# Patient Record
Sex: MALE | Race: BLACK OR AFRICAN AMERICAN | NOT HISPANIC OR LATINO | URBAN - METROPOLITAN AREA
[De-identification: names, ages, dates, MRNs, and addresses within clinical notes are randomized per-mention and may not be internally consistent; named-entity substitution may affect disease eponyms.]

---

## 2019-01-19 VITALS
SYSTOLIC BLOOD PRESSURE: 150 MMHG | DIASTOLIC BLOOD PRESSURE: 70 MMHG | RESPIRATION RATE: 18 BRPM | HEART RATE: 76 BPM | TEMPERATURE: 98 F | OXYGEN SATURATION: 98 %

## 2019-01-19 LAB
ALBUMIN SERPL ELPH-MCNC: 3.9 G/DL — SIGNIFICANT CHANGE UP (ref 3.3–5)
ALP SERPL-CCNC: 81 U/L — SIGNIFICANT CHANGE UP (ref 40–120)
ALT FLD-CCNC: 19 U/L — SIGNIFICANT CHANGE UP (ref 10–45)
ANION GAP SERPL CALC-SCNC: 13 MMOL/L — SIGNIFICANT CHANGE UP (ref 5–17)
AST SERPL-CCNC: 17 U/L — SIGNIFICANT CHANGE UP (ref 10–40)
BILIRUB SERPL-MCNC: 1 MG/DL — SIGNIFICANT CHANGE UP (ref 0.2–1.2)
BUN SERPL-MCNC: 19 MG/DL — SIGNIFICANT CHANGE UP (ref 7–23)
CALCIUM SERPL-MCNC: 9.7 MG/DL — SIGNIFICANT CHANGE UP (ref 8.4–10.5)
CHLORIDE SERPL-SCNC: 99 MMOL/L — SIGNIFICANT CHANGE UP (ref 96–108)
CO2 SERPL-SCNC: 27 MMOL/L — SIGNIFICANT CHANGE UP (ref 22–31)
CREAT SERPL-MCNC: 1.04 MG/DL — SIGNIFICANT CHANGE UP (ref 0.5–1.3)
CRP SERPL-MCNC: 1.42 MG/DL — HIGH (ref 0–0.4)
EOSINOPHIL NFR BLD AUTO: 3.9 % — SIGNIFICANT CHANGE UP (ref 0–6)
ERYTHROCYTE [SEDIMENTATION RATE] IN BLOOD: 59 MM/HR — HIGH
GLUCOSE SERPL-MCNC: 185 MG/DL — HIGH (ref 70–99)
HCT VFR BLD CALC: 31.2 % — LOW (ref 39–50)
HGB BLD-MCNC: 9.4 G/DL — LOW (ref 13–17)
LYMPHOCYTES # BLD AUTO: 19.6 % — SIGNIFICANT CHANGE UP (ref 13–44)
MCHC RBC-ENTMCNC: 25 PG — LOW (ref 27–34)
MCHC RBC-ENTMCNC: 30.1 G/DL — LOW (ref 32–36)
MCV RBC AUTO: 83 FL — SIGNIFICANT CHANGE UP (ref 80–100)
MONOCYTES NFR BLD AUTO: 10.4 % — SIGNIFICANT CHANGE UP (ref 2–14)
NEUTROPHILS NFR BLD AUTO: 66.1 % — SIGNIFICANT CHANGE UP (ref 43–77)
PLATELET # BLD AUTO: 243 K/UL — SIGNIFICANT CHANGE UP (ref 150–400)
POTASSIUM SERPL-MCNC: 4.3 MMOL/L — SIGNIFICANT CHANGE UP (ref 3.5–5.3)
POTASSIUM SERPL-SCNC: 4.3 MMOL/L — SIGNIFICANT CHANGE UP (ref 3.5–5.3)
PROT SERPL-MCNC: 7.4 G/DL — SIGNIFICANT CHANGE UP (ref 6–8.3)
RBC # BLD: 3.76 M/UL — LOW (ref 4.2–5.8)
RBC # FLD: 13.7 % — SIGNIFICANT CHANGE UP (ref 10.3–16.9)
SODIUM SERPL-SCNC: 139 MMOL/L — SIGNIFICANT CHANGE UP (ref 135–145)
WBC # BLD: 6.8 K/UL — SIGNIFICANT CHANGE UP (ref 3.8–10.5)
WBC # FLD AUTO: 6.8 K/UL — SIGNIFICANT CHANGE UP (ref 3.8–10.5)

## 2019-01-19 RX ORDER — FERROUS SULFATE 325(65) MG
1 TABLET ORAL
Qty: 0 | Refills: 0 | COMMUNITY

## 2019-01-19 RX ORDER — BACLOFEN 100 %
1 POWDER (GRAM) MISCELLANEOUS
Qty: 0 | Refills: 0 | COMMUNITY

## 2019-01-19 RX ORDER — ATENOLOL 25 MG/1
1 TABLET ORAL
Qty: 0 | Refills: 0 | COMMUNITY

## 2019-01-19 RX ORDER — ASPIRIN/CALCIUM CARB/MAGNESIUM 324 MG
1 TABLET ORAL
Qty: 0 | Refills: 0 | COMMUNITY

## 2019-01-19 RX ORDER — AMLODIPINE BESYLATE 2.5 MG/1
1 TABLET ORAL
Qty: 0 | Refills: 0 | COMMUNITY

## 2019-01-19 RX ORDER — ALLOPURINOL 300 MG
1 TABLET ORAL
Qty: 0 | Refills: 0 | COMMUNITY

## 2019-01-19 NOTE — ED ADULT NURSE NOTE - PMH
Degenerative arthritis of knee  Right  Gout    Hypertension    Refusal of blood transfusions as patient is Baptism

## 2019-01-19 NOTE — ED PROVIDER NOTE - PMH
Degenerative arthritis of knee  Right  Gout    Hypertension    Refusal of blood transfusions as patient is Mu-ism

## 2019-01-19 NOTE — ED PROVIDER NOTE - MEDICAL DECISION MAKING DETAILS
83 s/p right knee replacement 1 week ago, in the Er due to right LE edema, worsening of pain. clinica; findings concerned about possible DVT vs post op nfection. plan : labs, doppler, re-evaluate, ortho consult. 83 s/p right knee replacement 1 week ago, in the Er due to right LE edema, worsening of pain. clinica; findings concerned about possible DVT vs post op infection. plan : labs, doppler, re-evaluate, ortho consult.

## 2019-01-19 NOTE — ED PROVIDER NOTE - OBJECTIVE STATEMENT
84 yo male with h/o HTN, gout, GERD, 1 week s/p right knee replacement in the ER today due to right LE edema, redness and worsening of pain. Pt reports he was doing well with his PT, but leg gradually became more and more painful. Pt denies SOB, CP, fever, chills, numbness or tingling to his LE.

## 2019-01-19 NOTE — ED PROVIDER NOTE - ATTENDING CONTRIBUTION TO CARE
pt seen by ortho, no DVT apparent, ortho doubted septic knee at this time however recommended antibiotics for possible mild cellulitis ,  emergent return instructions were discussed with patient

## 2019-01-19 NOTE — ED PROVIDER NOTE - MUSCULOSKELETAL, MLM
Spine appears normal, range of motion is not limited, RLE edema, erythema increased warmth, no active drainage  from the incision over the knee, right calf and ankle edema and tenderness+,

## 2019-01-19 NOTE — ED PROVIDER NOTE - PROGRESS NOTE DETAILS
no evidence of DVT above the knee on doppler; pt evaluated by ortho on call - no evidence of septic joint. pt evaluated by vascular service- admission for treatment of cellulitis recommended.

## 2019-01-19 NOTE — ED ADULT NURSE NOTE - OBJECTIVE STATEMENT
Presents to ED for swelling of RLE s/p total knee replacement on 1/11/19.  Patient reports increased swelling of the RLE has gradually gotten worse of the past 2 days.  Reports pain upon palpation of the extremity.  Bandage remains in place from surgery, which is C/D/I.  Denies fevers, CP, palpitations, SOB, numbness, tingling.

## 2019-01-20 ENCOUNTER — INPATIENT (INPATIENT)
Facility: HOSPITAL | Age: 84
LOS: 0 days | Discharge: HOME | DRG: 603 | End: 2019-01-20
Attending: INTERNAL MEDICINE | Admitting: INTERNAL MEDICINE
Payer: MEDICARE

## 2019-01-20 ENCOUNTER — TRANSCRIPTION ENCOUNTER (OUTPATIENT)
Age: 84
End: 2019-01-20

## 2019-01-20 VITALS
OXYGEN SATURATION: 97 % | DIASTOLIC BLOOD PRESSURE: 80 MMHG | HEART RATE: 67 BPM | RESPIRATION RATE: 18 BRPM | SYSTOLIC BLOOD PRESSURE: 162 MMHG | TEMPERATURE: 98 F

## 2019-01-20 DIAGNOSIS — Z96.651 PRESENCE OF RIGHT ARTIFICIAL KNEE JOINT: Chronic | ICD-10-CM

## 2019-01-20 DIAGNOSIS — I10 ESSENTIAL (PRIMARY) HYPERTENSION: ICD-10-CM

## 2019-01-20 DIAGNOSIS — Z29.9 ENCOUNTER FOR PROPHYLACTIC MEASURES, UNSPECIFIED: ICD-10-CM

## 2019-01-20 DIAGNOSIS — M10.9 GOUT, UNSPECIFIED: ICD-10-CM

## 2019-01-20 DIAGNOSIS — R60.0 LOCALIZED EDEMA: ICD-10-CM

## 2019-01-20 DIAGNOSIS — Z94.7 CORNEAL TRANSPLANT STATUS: Chronic | ICD-10-CM

## 2019-01-20 PROCEDURE — 73562 X-RAY EXAM OF KNEE 3: CPT

## 2019-01-20 PROCEDURE — 80053 COMPREHEN METABOLIC PANEL: CPT

## 2019-01-20 PROCEDURE — 85652 RBC SED RATE AUTOMATED: CPT

## 2019-01-20 PROCEDURE — 73590 X-RAY EXAM OF LOWER LEG: CPT | Mod: 26,RT

## 2019-01-20 PROCEDURE — 73562 X-RAY EXAM OF KNEE 3: CPT | Mod: 26,RT

## 2019-01-20 PROCEDURE — 93971 EXTREMITY STUDY: CPT | Mod: 26,RT

## 2019-01-20 PROCEDURE — 85025 COMPLETE CBC W/AUTO DIFF WBC: CPT

## 2019-01-20 PROCEDURE — 99285 EMERGENCY DEPT VISIT HI MDM: CPT

## 2019-01-20 PROCEDURE — 93971 EXTREMITY STUDY: CPT

## 2019-01-20 PROCEDURE — 99285 EMERGENCY DEPT VISIT HI MDM: CPT | Mod: 25

## 2019-01-20 PROCEDURE — 73590 X-RAY EXAM OF LOWER LEG: CPT

## 2019-01-20 PROCEDURE — 36415 COLL VENOUS BLD VENIPUNCTURE: CPT

## 2019-01-20 PROCEDURE — 73610 X-RAY EXAM OF ANKLE: CPT

## 2019-01-20 PROCEDURE — 86140 C-REACTIVE PROTEIN: CPT

## 2019-01-20 PROCEDURE — 99223 1ST HOSP IP/OBS HIGH 75: CPT | Mod: GC

## 2019-01-20 PROCEDURE — 73610 X-RAY EXAM OF ANKLE: CPT | Mod: 26,RT

## 2019-01-20 RX ORDER — LOSARTAN POTASSIUM 100 MG/1
50 TABLET, FILM COATED ORAL DAILY
Qty: 0 | Refills: 0 | Status: DISCONTINUED | OUTPATIENT
Start: 2019-01-20 | End: 2019-01-20

## 2019-01-20 RX ORDER — CEPHALEXIN 500 MG
1 CAPSULE ORAL
Qty: 20 | Refills: 0 | OUTPATIENT
Start: 2019-01-20 | End: 2019-01-24

## 2019-01-20 RX ORDER — ASCORBIC ACID 60 MG
500 TABLET,CHEWABLE ORAL DAILY
Qty: 0 | Refills: 0 | Status: DISCONTINUED | OUTPATIENT
Start: 2019-01-20 | End: 2019-01-20

## 2019-01-20 RX ORDER — FERROUS SULFATE 325(65) MG
325 TABLET ORAL DAILY
Qty: 0 | Refills: 0 | Status: DISCONTINUED | OUTPATIENT
Start: 2019-01-20 | End: 2019-01-20

## 2019-01-20 RX ORDER — HEPARIN SODIUM 5000 [USP'U]/ML
5000 INJECTION INTRAVENOUS; SUBCUTANEOUS EVERY 8 HOURS
Qty: 0 | Refills: 0 | Status: DISCONTINUED | OUTPATIENT
Start: 2019-01-20 | End: 2019-01-20

## 2019-01-20 RX ORDER — SENNA PLUS 8.6 MG/1
2 TABLET ORAL AT BEDTIME
Qty: 0 | Refills: 0 | Status: DISCONTINUED | OUTPATIENT
Start: 2019-01-20 | End: 2019-01-20

## 2019-01-20 RX ORDER — DOCUSATE SODIUM 100 MG
100 CAPSULE ORAL THREE TIMES A DAY
Qty: 0 | Refills: 0 | Status: DISCONTINUED | OUTPATIENT
Start: 2019-01-20 | End: 2019-01-20

## 2019-01-20 RX ORDER — VANCOMYCIN HCL 1 G
1250 VIAL (EA) INTRAVENOUS ONCE
Qty: 0 | Refills: 0 | Status: COMPLETED | OUTPATIENT
Start: 2019-01-20 | End: 2019-01-20

## 2019-01-20 RX ORDER — AMLODIPINE BESYLATE 2.5 MG/1
10 TABLET ORAL DAILY
Qty: 0 | Refills: 0 | Status: DISCONTINUED | OUTPATIENT
Start: 2019-01-20 | End: 2019-01-20

## 2019-01-20 RX ORDER — ALLOPURINOL 300 MG
100 TABLET ORAL DAILY
Qty: 0 | Refills: 0 | Status: DISCONTINUED | OUTPATIENT
Start: 2019-01-20 | End: 2019-01-20

## 2019-01-20 RX ORDER — ACETAMINOPHEN 500 MG
650 TABLET ORAL ONCE
Qty: 0 | Refills: 0 | Status: COMPLETED | OUTPATIENT
Start: 2019-01-20 | End: 2019-01-20

## 2019-01-20 RX ORDER — VANCOMYCIN HCL 1 G
1000 VIAL (EA) INTRAVENOUS ONCE
Qty: 0 | Refills: 0 | Status: DISCONTINUED | OUTPATIENT
Start: 2019-01-20 | End: 2019-01-20

## 2019-01-20 RX ORDER — ATENOLOL 25 MG/1
50 TABLET ORAL DAILY
Qty: 0 | Refills: 0 | Status: DISCONTINUED | OUTPATIENT
Start: 2019-01-20 | End: 2019-01-20

## 2019-01-20 RX ORDER — IRBESARTAN 75 MG/1
1 TABLET ORAL
Qty: 0 | Refills: 0 | COMMUNITY

## 2019-01-20 RX ADMIN — AMLODIPINE BESYLATE 10 MILLIGRAM(S): 2.5 TABLET ORAL at 09:08

## 2019-01-20 RX ADMIN — LOSARTAN POTASSIUM 50 MILLIGRAM(S): 100 TABLET, FILM COATED ORAL at 09:06

## 2019-01-20 RX ADMIN — HEPARIN SODIUM 5000 UNIT(S): 5000 INJECTION INTRAVENOUS; SUBCUTANEOUS at 13:01

## 2019-01-20 RX ADMIN — Medication 166.67 MILLIGRAM(S): at 07:42

## 2019-01-20 RX ADMIN — Medication 100 MILLIGRAM(S): at 13:01

## 2019-01-20 RX ADMIN — ATENOLOL 50 MILLIGRAM(S): 25 TABLET ORAL at 09:31

## 2019-01-20 RX ADMIN — Medication 500 MILLIGRAM(S): at 13:01

## 2019-01-20 RX ADMIN — Medication 650 MILLIGRAM(S): at 03:30

## 2019-01-20 RX ADMIN — Medication 325 MILLIGRAM(S): at 13:01

## 2019-01-20 RX ADMIN — Medication 650 MILLIGRAM(S): at 02:43

## 2019-01-20 NOTE — DISCHARGE NOTE ADULT - HOSPITAL COURSE
83yMale POD8 s/p R TKA at [facility] by Dr. Menchaca. Patient presented to ER with RLE pain and swelling. Found to have likely mild cellulitis and will be discharged with Keflex for 5 days and to follow up with PCP within 1-2 weeks. Evaluated by orthopedics and vascular and low concern for infected knee replacement. No evidence of sepsis, no fever, no chills. Patient able to ambulate on own with wife and will continue PT at home . NO CLOT seen on ultrasound negative DVT. Will be discharged with PCP and surgery follow up and 5 days of oral Keflex. He acknowledged plan for discharge and agreed to follow.

## 2019-01-20 NOTE — DISCHARGE NOTE ADULT - PROVIDER TOKENS
FREE:[LAST:[[place]],FIRST:[Primo],PHONE:[(   )    -],FAX:[(   )    -]],FREE:[LAST:[Primo],FIRST:[Brigitte],PHONE:[(   )    -],FAX:[(   )    -],ADDRESS:[[address] [place] [id number]    [phone]]]

## 2019-01-20 NOTE — H&P ADULT - PMH
Degenerative arthritis of knee  Right  Gout    Hypertension    Refusal of blood transfusions as patient is Quaker Degenerative arthritis of knee  Right  Gout    Hypertension    Refusal of blood transfusions as patient is Amish    Right retinal detachment  has some vision.

## 2019-01-20 NOTE — DISCHARGE NOTE ADULT - MEDICATION SUMMARY - MEDICATIONS TO STOP TAKING
I will STOP taking the medications listed below when I get home from the hospital:    oxycodone-acetaminophen 5 mg-325 mg oral tablet  -- 1 tab(s) by mouth every 4 hours, As Needed

## 2019-01-20 NOTE — CONSULT NOTE ADULT - SUBJECTIVE AND OBJECTIVE BOX
Vascular Attending:  Abi  HPI:  Pt is a 83yMale pmhx of HTN s/p R TKA at [facility] 1/11.  Pt states after working out with physical therapist 2days prior he  noticed significant pain and swelling in R calf. Pt denies CP, SOB fevers or chills. Pt denies trauma to the area since surgery. Denies drainage from the wound.  Pt able to bend/extend knee without complication.    PAST MEDICAL & SURGICAL HISTORY:  Refusal of blood transfusions as patient is Religion  Gout  Hypertension  Degenerative arthritis of knee: Right  H/O total knee replacement, right      REVIEW OF SYSTEMS  Neg Except for HPI	  	  Allergic/Immunologic:	    MEDICATIONS  (STANDING):  vancomycin  IVPB 1250 milliGRAM(s) IV Intermittent once    MEDICATIONS  (PRN):    Allergies  No Known Allergies    Vital Signs Last 24 Hrs  T(C): 36.7 (20 Jan 2019 06:43), Max: 37.1 (20 Jan 2019 04:16)  T(F): 98.1 (20 Jan 2019 06:43), Max: 98.8 (20 Jan 2019 04:16)  HR: 69 (20 Jan 2019 06:43) (68 - 84)  BP: 170/85 (20 Jan 2019 06:43) (150/70 - 170/85)  BP(mean): --  RR: 18 (20 Jan 2019 06:43) (16 - 18)  SpO2: 96% (20 Jan 2019 06:43) (96% - 99%)    PHYSICAL EXAM:  Constitutional: Pt AXOX3 in NAD  Respiratory: Unlabored  Cardiovascular: S1S2  Extremities: RLE Moderate Edema erythema to medial aspect with warmth, good ROM based on 1wk post op, Incision c/d/i. no drainge no vince-incisonal induration or fluctuence  Vascular: palp 2+ DP Bilaterally  Neurological: Intact    LABS:                        9.4    6.8   )-----------( 243      ( 19 Jan 2019 22:13 )             31.2     01-19    139  |  99  |  19  ----------------------------<  185<H>  4.3   |  27  |  1.04    Ca    9.7      19 Jan 2019 22:13    TPro  7.4  /  Alb  3.9  /  TBili  1.0  /  DBili  x   /  AST  17  /  ALT  19  /  AlkPhos  81  01-19          RADIOLOGY & ADDITIONAL STUDIES

## 2019-01-20 NOTE — DISCHARGE NOTE ADULT - PATIENT PORTAL LINK FT
You can access the [facility] Patient Portal, offered by [facility], by registering with the following website: [email or web address]

## 2019-01-20 NOTE — DISCHARGE NOTE ADULT - PLAN OF CARE
Resolution You were admitted with concern for cellulitis; you were evaluated by orthopedics who suspected no relation to knee replacement as site appeared well. There is some mild redness of your shin which may be cellulitis. You will be discharged on antibiotic Keflex for 5 additional days. Please follow up with your primary care doctor within the next 2 weeks. You also had ultrasound to evaluate for Clot of lower extremity which was negative. continue with home PT, and ace wrapping of left extremity.

## 2019-01-20 NOTE — H&P ADULT - PSH
H/O total knee replacement, right Corneal transplant status  right eye  H/O total knee replacement, right

## 2019-01-20 NOTE — DISCHARGE NOTE ADULT - MEDICATION SUMMARY - MEDICATIONS TO TAKE
I will START or STAY ON the medications listed below when I get home from the hospital:    aspirin 325 mg oral tablet  -- 1 tab(s) by mouth 2 times a day  -- Indication: For DVT PPX per surgeon    irbesartan 300 mg oral tablet  -- 1 tab(s) by mouth once a day  -- Indication: For HTN    allopurinol 100 mg oral tablet  -- 1 tab(s) by mouth once a day  -- Indication: For Gout prophylaxis    atenolol 50 mg oral tablet  -- 1 tab(s) by mouth once a day  -- Indication: For HTN    amLODIPine 10 mg oral tablet  -- 1 tab(s) by mouth once a day  -- Indication: For HTN    Keflex 500 mg oral capsule  -- 1 cap(s) by mouth 4 times a day for Cellulitis  -- Finish all this medication unless otherwise directed by prescriber.    -- Indication: For Cellulitis    ferrous sulfate 325 mg (65 mg elemental iron) oral delayed release tablet  -- 1 tab(s) by mouth once a day  -- Indication: For Anemia    baclofen 10 mg oral tablet  -- 1 tab(s) by mouth 2 times a day, As Needed  -- Indication: For Muscle Spasm

## 2019-01-20 NOTE — H&P ADULT - PROBLEM SELECTOR PLAN 3
-c/w atenolol, losartan, norvasc.   -Losartan started at half dose since patient on irbesartan 300 (losartan 100); started 50mg and will titrate up. Is hypertensive this AM, but asymptomatic.

## 2019-01-20 NOTE — H&P ADULT - HISTORY OF PRESENT ILLNESS
83yMale POD8 s/p R TKA at [facility] by Dr. Menchaca. Patient presented to ER with pain and swelling in right calf.         In the ED:  ICU Vital Signs Last 24 Hrs  T(C): 36.7 (20 Jan 2019 06:43), Max: 37.1 (20 Jan 2019 04:16)  T(F): 98.1 (20 Jan 2019 06:43), Max: 98.8 (20 Jan 2019 04:16)  HR: 69 (20 Jan 2019 06:43) (68 - 84)  BP: 170/85 (20 Jan 2019 06:43) (150/70 - 170/85)  BP(mean): --  ABP: --  ABP(mean): --  RR: 18 (20 Jan 2019 06:43) (16 - 18)  SpO2: 96% (20 Jan 2019 06:43) (96% - 99%)      In the ED evaluated by orthopedics, determined no acute infection of joint, negative DVT by ultrasound. Given 1x dose of vancomycin and acetaminophen 83yMale POD8 s/p R TKA at [facility] by Dr. Menchaca. Patient presented to ER with RLE pain and swelling. Patient states that 1-2 days prior after he worked with physical therapy he noted the pain to be present. The swelling has been present         In the ED:  ICU Vital Signs Last 24 Hrs  T(C): 36.7 (20 Jan 2019 06:43), Max: 37.1 (20 Jan 2019 04:16)  T(F): 98.1 (20 Jan 2019 06:43), Max: 98.8 (20 Jan 2019 04:16)  HR: 69 (20 Jan 2019 06:43) (68 - 84)  BP: 170/85 (20 Jan 2019 06:43) (150/70 - 170/85)  BP(mean): --  ABP: --  ABP(mean): --  RR: 18 (20 Jan 2019 06:43) (16 - 18)  SpO2: 96% (20 Jan 2019 06:43) (96% - 99%)      In the ED evaluated by orthopedics, determined no acute infection of joint, negative DVT by ultrasound. Given 1x dose of vancomycin and acetaminophen 83yMale POD8 s/p R TKA at [facility] by Dr. Menchaca. Patient presented to ER with RLE pain and swelling. Patient states that 1-2 days prior after he worked with physical therapy he noted the pain to be present. He has had some swelling post surgery and was given course of steroids by surgeon to decrease swelling/inflammation. Patient was given walker to aid in ambulation but has not been using it much, PT has seen at home. He denies fevers, chills, nausea, vomiting, diaphoresis, shortness of breath, palpitations, chest pain.      In the ED:  ICU Vital Signs Last 24 Hrs  T(C): 36.7 (20 Jan 2019 06:43), Max: 37.1 (20 Jan 2019 04:16)  T(F): 98.1 (20 Jan 2019 06:43), Max: 98.8 (20 Jan 2019 04:16)  HR: 69 (20 Jan 2019 06:43) (68 - 84)  BP: 170/85 (20 Jan 2019 06:43) (150/70 - 170/85)  BP(mean): --  ABP: --  ABP(mean): --  RR: 18 (20 Jan 2019 06:43) (16 - 18)  SpO2: 96% (20 Jan 2019 06:43) (96% - 99%)      In the ED evaluated by orthopedics, determined no acute infection of joint, negative DVT by ultrasound. Given 1x dose of vancomycin and acetaminophen

## 2019-01-20 NOTE — DISCHARGE NOTE ADULT - CARE PROVIDER_API CALL
Primo Briggs  Phone: (   )    -  Fax: (   )    -    Brigitte Tillman  [address] [place] [id number] [phone]  Phone: (   )    -  Fax: (   )    -

## 2019-01-20 NOTE — DISCHARGE NOTE ADULT - CARE PLAN
Principal Discharge DX:	Cellulitis  Goal:	Resolution  Assessment and plan of treatment:	You were admitted with concern for cellulitis; you were evaluated by orthopedics who suspected no relation to knee replacement as site appeared well. There is some mild redness of your shin which may be cellulitis. You will be discharged on antibiotic Keflex for 5 additional days. Please follow up with your primary care doctor within the next 2 weeks. You also had ultrasound to evaluate for Clot of lower extremity which was negative.  Secondary Diagnosis:	H/O total knee replacement, right  Assessment and plan of treatment:	continue with home PT, and ace wrapping of left extremity.

## 2019-01-20 NOTE — PATIENT PROFILE ADULT - NSPROCHRONICPAIN_GEN_A_NUR
LM to inform pts wife. Will have to wait the full 6 weeks.
Please make sure that there has been 6 weeks of conservative treatment before instituting a prior authorization
Reordered MRI
no

## 2019-01-20 NOTE — CONSULT NOTE ADULT - SUBJECTIVE AND OBJECTIVE BOX
Orthopaedic Surgery Consult Note    For Surgeon: Eugenio    HPI:  83yMale POD8 s/p R TKA at [facility] by Dr. Menchaca. Pt states over the last day or so he has noticed significant pain and swelling in R calf. Pt denies fevers/chills/systemic sxs. Pt denies trauma to the area since surgery. Denies drainage from the wound. Denies other sxs at this time.      Allergies    No Known Allergies    Intolerances      PAST MEDICAL & SURGICAL HISTORY:  Refusal of blood transfusions as patient is Taoist  Gout  Hypertension  Degenerative arthritis of knee: Right  H/O total knee replacement, right    MEDICATIONS  (STANDING):  vancomycin  IVPB 1000 milliGRAM(s) IV Intermittent once    MEDICATIONS  (PRN):      Vital Signs Last 24 Hrs  T(C): 37.1 (20 Jan 2019 04:16), Max: 37.1 (20 Jan 2019 04:16)  T(F): 98.8 (20 Jan 2019 04:16), Max: 98.8 (20 Jan 2019 04:16)  HR: 84 (20 Jan 2019 04:16) (68 - 84)  BP: 157/77 (20 Jan 2019 04:16) (150/70 - 170/74)  BP(mean): --  RR: 18 (20 Jan 2019 04:16) (18 - 18)  SpO2: 98% (20 Jan 2019 04:16) (97% - 99%)    Physical Exam:  General: Elderly male lying supine; NAD A&Ox3  RLE: Significantly swollen, erythematous and warm below the level of the knee; Aquacell dressing in place removed; Surgical site w Dermabond over it and no sites of active drainage; No fluctuance or purulence expressible;  Knee active ROM approximately 0-60 degrees; Ankle ROM full and intact  Significant TTP over R calf with noticeable warmth and increase in calf diameter compared to the left; No palpable venous cords  SILT distally  DP 2+, cap refill < 2 sec                          9.4    6.8   )-----------( 243      ( 19 Jan 2019 22:13 )             31.2     01-19    139  |  99  |  19  ----------------------------<  185<H>  4.3   |  27  |  1.04    Ca    9.7      19 Jan 2019 22:13    TPro  7.4  /  Alb  3.9  /  TBili  1.0  /  DBili  x   /  AST  17  /  ALT  19  /  AlkPhos  81  01-19      Imaging: XR R tib-fib, knee, akle-  Negative  RLE Doppler- Negative for DVT

## 2019-01-20 NOTE — H&P ADULT - ASSESSMENT
82 yo male hx HTN, 1 week ago right total knee replacement presents with pain/RLE swelling admitted for cellulitis by ED though upon examination/history/review of labs, no evidence of infection at this time. Swelling appears to be post-surgical in nature, differentials would also include gout, possible blood loss though significantly less likely, as ROS negative. Ankle pathology re: sprain/ligamentous tear should be considered as well.

## 2019-01-20 NOTE — H&P ADULT - NSHPPHYSICALEXAM_GEN_ALL_CORE
.  VITAL SIGNS:  T(C): 36.7 (01-20-19 @ 06:43), Max: 37.1 (01-20-19 @ 04:16)  T(F): 98.1 (01-20-19 @ 06:43), Max: 98.8 (01-20-19 @ 04:16)  HR: 69 (01-20-19 @ 06:43) (68 - 84)  BP: 170/85 (01-20-19 @ 06:43) (150/70 - 170/85)  BP(mean): --  RR: 18 (01-20-19 @ 06:43) (16 - 18)  SpO2: 96% (01-20-19 @ 06:43) (96% - 99%)  Wt(kg): --    PHYSICAL EXAM:    Constitutional: WDWN resting comfortably in bed; NAD  Head: NC/AT  Eyes: PERRL, EOMI, anicteric sclera  ENT: no nasal discharge; uvula midline, no oropharyngeal erythema or exudates; MMM  Neck: supple; no JVD or thyromegaly  Respiratory: CTA B/L; no W/R/R, no retractions  Cardiac: +S1/S2; RRR; no M/R/G; PMI non-displaced  Gastrointestinal: soft, NT/ND; no rebound or guarding; +BSx4  Genitourinary: normal external genitalia  Back: spine midline, no bony tenderness or step-offs; no CVAT B/L  Extremities: WWP, no clubbing or cyanosis; no peripheral edema  Musculoskeletal: NROM x4; no joint swelling, tenderness or erythema  Vascular: 2+ radial, femoral, DP/PT pulses B/L  Dermatologic: skin warm, dry and intact; no rashes, wounds, or scars  Lymphatic: no submandibular or cervical LAD  Neurologic: AAOx3; CNII-XII grossly intact; no focal deficits  Psychiatric: affect and characteristics of appearance, verbalizations, behaviors are appropriate .  VITAL SIGNS:  T(C): 36.7 (01-20-19 @ 06:43), Max: 37.1 (01-20-19 @ 04:16)  T(F): 98.1 (01-20-19 @ 06:43), Max: 98.8 (01-20-19 @ 04:16)  HR: 69 (01-20-19 @ 06:43) (68 - 84)  BP: 170/85 (01-20-19 @ 06:43) (150/70 - 170/85)  BP(mean): --  RR: 18 (01-20-19 @ 06:43) (16 - 18)  SpO2: 96% (01-20-19 @ 06:43) (96% - 99%)  Wt(kg): --    PHYSICAL EXAM:    Constitutional: WDWN resting comfortably in bed; NAD  Head: NC/AT  Eyes:  EOMI, anicteric sclera, right eye white out, minimal vision. Left eye ERRL  ENT: no nasal discharge; uvula midline, no oropharyngeal erythema or exudates; MMM  Neck: supple; no JVD or thyromegaly  Respiratory: CTA B/L; no W/R/R, no retractions  Cardiac: +S1/S2; RRR; no M/R/G;  3/6 systolic murmur heard best in left upper sternal border (also appreciated in RUSB, LLSB, no radiation to axilla, carotids)  Gastrointestinal: soft, NT/ND; no rebound or guarding; +BSx4  Extremities: WWP, no clubbing or cyanosis; +pitting edema in RLE, area of pain is Lateral Malleolus of right leg, no pain now. Leg wrapped in ace bandage. Surgery site clean, stapled/sutured without erythema/discoloration, range of motion intact without pain.   Musculoskeletal: NROM x4; no joint swelling, tenderness or erythema  Vascular: 2+ radial, femoral, DP/PT pulses B/L  Dermatologic: skin warm, dry and intact; no rashes, wounds, or scars  Lymphatic: no submandibular or cervical LAD  Neurologic: AAOx3; CNII-XII grossly intact; no focal deficits  Psychiatric: affect and characteristics of appearance, verbalizations, behaviors are appropriate

## 2019-01-20 NOTE — CONSULT NOTE ADULT - ASSESSMENT
83 y.o male PMHx of HTN s/P RTKR pod#8 with significant RLE edema. Duplex Neg DVT    -Recommend Compression Elevation of RLE  -IV Antibx for Cellulitis   -Will cont to follow

## 2019-01-20 NOTE — H&P ADULT - NSHPLABSRESULTS_GEN_ALL_CORE
.  LABS:                         9.4    6.8   )-----------( 243      ( 19 Jan 2019 22:13 )             31.2     01-19    139  |  99  |  19  ----------------------------<  185<H>  4.3   |  27  |  1.04    Ca    9.7      19 Jan 2019 22:13    TPro  7.4  /  Alb  3.9  /  TBili  1.0  /  DBili  x   /  AST  17  /  ALT  19  /  AlkPhos  81  01-19                  RADIOLOGY, EKG & ADDITIONAL TESTS: Reviewed.

## 2019-01-20 NOTE — CONSULT NOTE ADULT - ASSESSMENT
A/P: 83yMale POD 8 s/p R TKA at [place] who presents w pain, erythema, and swelling of RLE without trauma. Given pts recent surgical hx, must evaluate for DVT  -No acute orthopedic intervention at this time-- TKA hardware stable  -Rec full evaluation for DVT  -Cellulitis picture unlikely based on exam  -Discussed with Dr. Becker

## 2019-01-20 NOTE — H&P ADULT - ATTENDING COMMENTS
Cellulitis of right leg: Shin with 4x5 area of erythema and very mild increase in warmth. Plan for keflex x5 days and f/u with PCP and outpt ortho.

## 2019-01-23 DIAGNOSIS — H33.21 SEROUS RETINAL DETACHMENT, RIGHT EYE: ICD-10-CM

## 2019-01-23 DIAGNOSIS — Z79.899 OTHER LONG TERM (CURRENT) DRUG THERAPY: ICD-10-CM

## 2019-01-23 DIAGNOSIS — Z96.651 PRESENCE OF RIGHT ARTIFICIAL KNEE JOINT: ICD-10-CM

## 2019-01-23 DIAGNOSIS — M10.9 GOUT, UNSPECIFIED: ICD-10-CM

## 2019-01-23 DIAGNOSIS — Z79.82 LONG TERM (CURRENT) USE OF ASPIRIN: ICD-10-CM

## 2019-01-23 DIAGNOSIS — I10 ESSENTIAL (PRIMARY) HYPERTENSION: ICD-10-CM

## 2019-01-23 DIAGNOSIS — Z94.7 CORNEAL TRANSPLANT STATUS: ICD-10-CM

## 2019-01-23 DIAGNOSIS — L03.115 CELLULITIS OF RIGHT LOWER LIMB: ICD-10-CM

## 2019-01-23 DIAGNOSIS — L03.90 CELLULITIS, UNSPECIFIED: ICD-10-CM

## 2022-03-30 PROBLEM — Z53.1 PROCEDURE AND TREATMENT NOT CARRIED OUT BECAUSE OF PATIENT'S DECISION FOR REASONS OF BELIEF AND GROUP PRESSURE: Chronic | Status: ACTIVE | Noted: 2019-01-19

## 2022-03-30 PROBLEM — M17.10 UNILATERAL PRIMARY OSTEOARTHRITIS, UNSPECIFIED KNEE: Chronic | Status: ACTIVE | Noted: 2019-01-19

## 2022-03-30 PROBLEM — H33.21 SEROUS RETINAL DETACHMENT, RIGHT EYE: Chronic | Status: ACTIVE | Noted: 2019-01-20

## 2022-03-30 PROBLEM — M10.9 GOUT, UNSPECIFIED: Chronic | Status: ACTIVE | Noted: 2019-01-19

## 2022-03-30 PROBLEM — I10 ESSENTIAL (PRIMARY) HYPERTENSION: Chronic | Status: ACTIVE | Noted: 2019-01-19

## 2022-04-29 ENCOUNTER — APPOINTMENT (OUTPATIENT)
Dept: INTERNAL MEDICINE | Facility: CLINIC | Age: 87
End: 2022-04-29
Payer: MEDICARE

## 2022-04-29 VITALS
HEIGHT: 69 IN | WEIGHT: 174 LBS | TEMPERATURE: 98.1 F | DIASTOLIC BLOOD PRESSURE: 65 MMHG | OXYGEN SATURATION: 100 % | SYSTOLIC BLOOD PRESSURE: 148 MMHG | BODY MASS INDEX: 25.77 KG/M2 | HEART RATE: 84 BPM

## 2022-04-29 PROCEDURE — 36415 COLL VENOUS BLD VENIPUNCTURE: CPT

## 2022-04-29 PROCEDURE — 99387 INIT PM E/M NEW PAT 65+ YRS: CPT | Mod: 25

## 2022-04-29 NOTE — PHYSICAL EXAM
[Soft] : abdomen soft [Non Tender] : non-tender [Coordination Grossly Intact] : coordination grossly intact [No Focal Deficits] : no focal deficits [Normal Gait] : normal gait [Normal] : affect was normal and insight and judgment were intact [de-identified] : Left eye PERRL, right eye with cataracts, no vision

## 2022-04-29 NOTE — HISTORY OF PRESENT ILLNESS
[Spouse] : spouse [de-identified] : 88 yo male with pmhx of HTN, L hip replacement, R knee replacement who presents for annual \par With wife at bedside\par Patient states that he has no complaints, feels well overall \par Per wife, has some right hip pain that has been painful and worse at night, similar pain to his left hip before replacement\par Also has been having start of dementia with short term memory issues \par \par Social etoh, no drugs, 40 pack year smoking history, quit many years ago \par Performs ADLs at home, walks in apartment daily \par 5 days a week HHA

## 2022-04-29 NOTE — ASSESSMENT
[FreeTextEntry1] : Will weight himself weekly, ensure adequate PO intake and not become dehydrated, lose appetite\par Wife is on board

## 2022-04-29 NOTE — HEALTH RISK ASSESSMENT
[Very Good] : ~his/her~  mood as very good [Former] : Former [Yes] : Yes [Monthly or less (1 pt)] : Monthly or less (1 point) [1 or 2 (0 pts)] : 1 or 2 (0 points) [Never (0 pts)] : Never (0 points) [No] : In the past 12 months have you used drugs other than those required for medical reasons? No [No falls in past year] : Patient reported no falls in the past year [0] : 2) Feeling down, depressed, or hopeless: Not at all (0) [PHQ-2 Negative - No further assessment needed] : PHQ-2 Negative - No further assessment needed [Audit-CScore] : 1 [[id number]] : 0 [Change in mental status noted] : Change in mental status noted [Language] : denies difficulty with language [Transportation] : transportation [With Significant Other] : lives with significant other [Retired] : retired [] :  [Sexually Active] : not sexually active [High Risk Behavior] : no high risk behavior [Feels Safe at Home] : Feels safe at home [Fully functional (bathing, dressing, toileting, transferring, walking, feeding)] : Fully functional (bathing, dressing, toileting, transferring, walking, feeding) [Reports changes in vision] : Reports changes in vision [de-identified] : with cataract and prior hx of retinal detachment, lost vision in right eye [FreeTextEntry4] : Will discuss with wife

## 2022-05-02 LAB
25(OH)D3 SERPL-MCNC: 31.3 NG/ML
ALBUMIN SERPL ELPH-MCNC: 4.5 G/DL
ALP BLD-CCNC: 102 U/L
ALT SERPL-CCNC: 15 U/L
ANION GAP SERPL CALC-SCNC: 14 MMOL/L
AST SERPL-CCNC: 23 U/L
BASOPHILS # BLD AUTO: 0.03 K/UL
BASOPHILS NFR BLD AUTO: 0.7 %
BILIRUB SERPL-MCNC: 0.8 MG/DL
BUN SERPL-MCNC: 18 MG/DL
CALCIUM SERPL-MCNC: 9.8 MG/DL
CHLORIDE SERPL-SCNC: 104 MMOL/L
CHOLEST SERPL-MCNC: 167 MG/DL
CO2 SERPL-SCNC: 23 MMOL/L
CREAT SERPL-MCNC: 1.09 MG/DL
EGFR: 66 ML/MIN/1.73M2
EOSINOPHIL # BLD AUTO: 0.12 K/UL
EOSINOPHIL NFR BLD AUTO: 3 %
GLUCOSE SERPL-MCNC: 130 MG/DL
HCT VFR BLD CALC: 36.4 %
HDLC SERPL-MCNC: 78 MG/DL
HGB BLD-MCNC: 10.6 G/DL
IMM GRANULOCYTES NFR BLD AUTO: 0 %
LDLC SERPL CALC-MCNC: 79 MG/DL
LYMPHOCYTES # BLD AUTO: 1.57 K/UL
LYMPHOCYTES NFR BLD AUTO: 38.8 %
MAN DIFF?: NORMAL
MCHC RBC-ENTMCNC: 25.5 PG
MCHC RBC-ENTMCNC: 29.1 GM/DL
MCV RBC AUTO: 87.7 FL
MONOCYTES # BLD AUTO: 0.41 K/UL
MONOCYTES NFR BLD AUTO: 10.1 %
NEUTROPHILS # BLD AUTO: 1.92 K/UL
NEUTROPHILS NFR BLD AUTO: 47.4 %
NONHDLC SERPL-MCNC: 89 MG/DL
PLATELET # BLD AUTO: 181 K/UL
POTASSIUM SERPL-SCNC: 4.4 MMOL/L
PROT SERPL-MCNC: 7.3 G/DL
RBC # BLD: 4.15 M/UL
RBC # FLD: 13.6 %
SODIUM SERPL-SCNC: 141 MMOL/L
TRIGL SERPL-MCNC: 47 MG/DL
WBC # FLD AUTO: 4.05 K/UL

## 2022-06-16 ENCOUNTER — NON-APPOINTMENT (OUTPATIENT)
Age: 87
End: 2022-06-16

## 2022-06-16 ENCOUNTER — APPOINTMENT (OUTPATIENT)
Dept: HEART AND VASCULAR | Facility: CLINIC | Age: 87
End: 2022-06-16
Payer: MEDICARE

## 2022-06-16 VITALS
HEIGHT: 69 IN | DIASTOLIC BLOOD PRESSURE: 94 MMHG | OXYGEN SATURATION: 98 % | BODY MASS INDEX: 25.92 KG/M2 | TEMPERATURE: 97 F | HEART RATE: 103 BPM | SYSTOLIC BLOOD PRESSURE: 151 MMHG | WEIGHT: 175 LBS

## 2022-06-16 DIAGNOSIS — R00.0 TACHYCARDIA, UNSPECIFIED: ICD-10-CM

## 2022-06-16 PROCEDURE — 93000 ELECTROCARDIOGRAM COMPLETE: CPT

## 2022-06-16 PROCEDURE — 99203 OFFICE O/P NEW LOW 30 MIN: CPT

## 2022-06-16 NOTE — REASON FOR VISIT
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  Here to establish care, no specific c/o.\par \par EKG: NSR with 1st degree AVB, IVCD. No ST-Tw abnormalities. 6/16/22

## 2022-06-16 NOTE — REVIEW OF SYSTEMS
[Joint Pain] : joint pain [Memory Lapses Or Loss] : memory lapses or loss [Negative] : Heme/Lymph [Lower Ext Edema] : lower extremity edema

## 2022-06-16 NOTE — ASSESSMENT
[FreeTextEntry1] : HTN- BP reasonable\par \par Edema- Edema most likely 2/T VI and 10 mg Norvasc dose. Will start HCTZ weekly for HTN and edema\par \par Tachycardia- Baseline echo ordered\par \par

## 2022-06-16 NOTE — PHYSICAL EXAM
[Well Developed] : well developed [Well Nourished] : well nourished [No Acute Distress] : no acute distress [Normal Conjunctiva] : normal conjunctiva [Normal Venous Pressure] : normal venous pressure [No Carotid Bruit] : no carotid bruit [Normal S1, S2] : normal S1, S2 [No Murmur] : no murmur [No Rub] : no rub [No Gallop] : no gallop [Clear Lung Fields] : clear lung fields [Good Air Entry] : good air entry [No Respiratory Distress] : no respiratory distress  [Soft] : abdomen soft [Non Tender] : non-tender [No Masses/organomegaly] : no masses/organomegaly [Normal Bowel Sounds] : normal bowel sounds [No Cyanosis] : no cyanosis [No Clubbing] : no clubbing [No Varicosities] : no varicosities [No Rash] : no rash [No Skin Lesions] : no skin lesions [Moves all extremities] : moves all extremities [No Focal Deficits] : no focal deficits [Normal Speech] : normal speech [Alert and Oriented] : alert and oriented [Edema ___] : edema [unfilled] [de-identified] : Gait slightly unsteady [de-identified] : Moderate edema above sock line [de-identified] : memory poor

## 2022-06-17 ENCOUNTER — APPOINTMENT (OUTPATIENT)
Dept: INTERNAL MEDICINE | Facility: CLINIC | Age: 87
End: 2022-06-17
Payer: MEDICARE

## 2022-06-17 VITALS
TEMPERATURE: 98.7 F | DIASTOLIC BLOOD PRESSURE: 72 MMHG | BODY MASS INDEX: 26.66 KG/M2 | WEIGHT: 180 LBS | SYSTOLIC BLOOD PRESSURE: 155 MMHG | OXYGEN SATURATION: 98 % | HEIGHT: 69 IN | HEART RATE: 84 BPM

## 2022-06-17 VITALS — SYSTOLIC BLOOD PRESSURE: 146 MMHG | DIASTOLIC BLOOD PRESSURE: 78 MMHG

## 2022-06-17 PROCEDURE — 99214 OFFICE O/P EST MOD 30 MIN: CPT

## 2022-06-17 NOTE — HISTORY OF PRESENT ILLNESS
[de-identified] : 88 yo male with pmhx of HTN, L hip replacement, R knee replacement who presents for follow up \par Overall feels well, with no complaints \par Endorses increasing his appetite, has been eating at home \par Wife at bedside notes continued hip pain, notes that his dementia has been worsening. Woke up in the middle of the night and was hungry so started making dinner, patient was not aware that he did that \par LE edema is not bothersome, patient is not aware if they have worsened or not

## 2022-06-17 NOTE — PHYSICAL EXAM
[Soft] : abdomen soft [Non Tender] : non-tender [Coordination Grossly Intact] : coordination grossly intact [No Focal Deficits] : no focal deficits [Normal Gait] : normal gait [Alert and Oriented x3] : oriented to person, place, and time [Normal] : affect was normal and insight and judgment were intact [de-identified] : Left eye PERRL, right eye with cataracts, no vision

## 2022-08-09 ENCOUNTER — OUTPATIENT (OUTPATIENT)
Dept: OUTPATIENT SERVICES | Facility: HOSPITAL | Age: 87
LOS: 1 days | End: 2022-08-09
Payer: MEDICARE

## 2022-08-09 ENCOUNTER — APPOINTMENT (OUTPATIENT)
Dept: CT IMAGING | Facility: HOSPITAL | Age: 87
End: 2022-08-09

## 2022-08-09 DIAGNOSIS — Z96.651 PRESENCE OF RIGHT ARTIFICIAL KNEE JOINT: Chronic | ICD-10-CM

## 2022-08-09 DIAGNOSIS — Z94.7 CORNEAL TRANSPLANT STATUS: Chronic | ICD-10-CM

## 2022-08-09 PROCEDURE — 70450 CT HEAD/BRAIN W/O DYE: CPT | Mod: 26

## 2022-08-09 PROCEDURE — 70450 CT HEAD/BRAIN W/O DYE: CPT

## 2022-08-09 PROCEDURE — 73502 X-RAY EXAM HIP UNI 2-3 VIEWS: CPT

## 2022-08-09 PROCEDURE — 73502 X-RAY EXAM HIP UNI 2-3 VIEWS: CPT | Mod: 26,RT

## 2022-08-12 RX ORDER — DICLOFENAC SODIUM 1% 10 MG/G
1 GEL TOPICAL
Qty: 100 | Refills: 3 | Status: ACTIVE | COMMUNITY
Start: 2022-04-29 | End: 1900-01-01

## 2023-02-13 ENCOUNTER — APPOINTMENT (OUTPATIENT)
Dept: HEART AND VASCULAR | Facility: CLINIC | Age: 88
End: 2023-02-13
Payer: MEDICARE

## 2023-02-13 VITALS
DIASTOLIC BLOOD PRESSURE: 80 MMHG | SYSTOLIC BLOOD PRESSURE: 154 MMHG | WEIGHT: 181.25 LBS | HEIGHT: 69 IN | TEMPERATURE: 99.1 F | BODY MASS INDEX: 26.84 KG/M2 | HEART RATE: 79 BPM | OXYGEN SATURATION: 98 %

## 2023-02-13 VITALS — SYSTOLIC BLOOD PRESSURE: 137 MMHG | DIASTOLIC BLOOD PRESSURE: 70 MMHG

## 2023-02-13 DIAGNOSIS — Z87.891 PERSONAL HISTORY OF NICOTINE DEPENDENCE: ICD-10-CM

## 2023-02-13 DIAGNOSIS — Z72.3 LACK OF PHYSICAL EXERCISE: ICD-10-CM

## 2023-02-13 PROCEDURE — 99213 OFFICE O/P EST LOW 20 MIN: CPT

## 2023-02-13 NOTE — REASON FOR VISIT
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  Here to establish care, no specific c/o.\par \par 2/13/23  Again no c/o other that orthopedic pains\par \par EKG: NSR with 1st degree AVB, IVCD. No ST-Tw abnormalities. 6/16/22

## 2023-02-13 NOTE — REVIEW OF SYSTEMS
[Lower Ext Edema] : lower extremity edema [Joint Pain] : joint pain [Memory Lapses Or Loss] : memory lapses or loss [Negative] : Heme/Lymph

## 2023-02-13 NOTE — PHYSICAL EXAM
[Well Developed] : well developed [Well Nourished] : well nourished [No Acute Distress] : no acute distress [Normal Conjunctiva] : normal conjunctiva [Normal Venous Pressure] : normal venous pressure [No Carotid Bruit] : no carotid bruit [Normal S1, S2] : normal S1, S2 [No Murmur] : no murmur [No Rub] : no rub [No Gallop] : no gallop [Clear Lung Fields] : clear lung fields [Good Air Entry] : good air entry [No Respiratory Distress] : no respiratory distress  [Soft] : abdomen soft [Non Tender] : non-tender [No Masses/organomegaly] : no masses/organomegaly [Normal Bowel Sounds] : normal bowel sounds [No Cyanosis] : no cyanosis [No Clubbing] : no clubbing [No Varicosities] : no varicosities [Edema ___] : edema [unfilled] [No Rash] : no rash [No Skin Lesions] : no skin lesions [Moves all extremities] : moves all extremities [No Focal Deficits] : no focal deficits [Normal Speech] : normal speech [Alert and Oriented] : alert and oriented [de-identified] : Gait slightly unsteady [de-identified] : Moderate edema above sock line [de-identified] : memory poor

## 2023-02-13 NOTE — ASSESSMENT
[FreeTextEntry1] : HTN- BP reasonable\par \par Edema- Edema most likely 2/T VI and 10 mg Norvasc dose. Norvasc now at 5 mg. Will start HCTZ weekly for HTN and currently at MWF.\par \par Tachycardia- Baseline echo ordered, not done.  Reordered\par \par

## 2023-02-21 ENCOUNTER — RX RENEWAL (OUTPATIENT)
Age: 88
End: 2023-02-21

## 2023-03-22 ENCOUNTER — RX RENEWAL (OUTPATIENT)
Age: 88
End: 2023-03-22

## 2023-05-04 ENCOUNTER — APPOINTMENT (OUTPATIENT)
Dept: INTERNAL MEDICINE | Facility: CLINIC | Age: 88
End: 2023-05-04
Payer: MEDICARE

## 2023-05-04 DIAGNOSIS — R41.3 OTHER AMNESIA: ICD-10-CM

## 2023-05-04 PROCEDURE — 36415 COLL VENOUS BLD VENIPUNCTURE: CPT

## 2023-05-04 PROCEDURE — 99214 OFFICE O/P EST MOD 30 MIN: CPT | Mod: 25

## 2023-05-07 ENCOUNTER — RX RENEWAL (OUTPATIENT)
Age: 88
End: 2023-05-07

## 2023-05-07 PROBLEM — R41.3 MEMORY CHANGES: Status: ACTIVE | Noted: 2022-04-29

## 2023-05-07 NOTE — ASSESSMENT
[FreeTextEntry1] : #HTN \par Improved on recheck, cw current regimen \par \par #LE edema \par Improved with addition of HCTZ, continue at this time \par \par #R hip pain\par Recommend careful NSAID use, tylenol does not work per wife \par Recommend ortho eval , may benefit from joint injx \par Not interested in THR \par \par #Memory changes \par Check B12, folate, TSH \par On donepezil \par Likely age related, needs assistance with iADLs \par \par disability paperwork completed for medicaid application, will fax

## 2023-05-07 NOTE — PHYSICAL EXAM
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

## 2023-05-07 NOTE — HISTORY OF PRESENT ILLNESS
[de-identified] : 88 yo male with pmhx of HTN, L hip replacement, R knee replacement who presents for follow up \par Overall feels well, with no complaints \par However Wife at bedside notes continued hip pain, notes the right pain has been bothering him, has been taking ibuprofen daily for the hip pain \par Notes that his dementia has been worsening, applying for medicaid to help with HHA, has disability paperwork that needs completion\par LE edema has improved since starting HCTZ\par

## 2023-05-08 LAB
25(OH)D3 SERPL-MCNC: 32.3 NG/ML
ALBUMIN SERPL ELPH-MCNC: 4.5 G/DL
ALP BLD-CCNC: 123 U/L
ALT SERPL-CCNC: 17 U/L
ANION GAP SERPL CALC-SCNC: 13 MMOL/L
APPEARANCE: CLEAR
AST SERPL-CCNC: 27 U/L
BACTERIA: NEGATIVE /HPF
BASOPHILS # BLD AUTO: 0.03 K/UL
BASOPHILS NFR BLD AUTO: 0.8 %
BILIRUB SERPL-MCNC: 0.8 MG/DL
BILIRUBIN URINE: NEGATIVE
BLOOD URINE: NEGATIVE
BUN SERPL-MCNC: 19 MG/DL
CALCIUM SERPL-MCNC: 9.9 MG/DL
CAST: 0 /LPF
CHLORIDE SERPL-SCNC: 105 MMOL/L
CO2 SERPL-SCNC: 26 MMOL/L
COLOR: YELLOW
CREAT SERPL-MCNC: 1.17 MG/DL
EGFR: 60 ML/MIN/1.73M2
EOSINOPHIL # BLD AUTO: 0.17 K/UL
EOSINOPHIL NFR BLD AUTO: 4.4 %
EPITHELIAL CELLS: 1 /HPF
FOLATE SERPL-MCNC: 19.7 NG/ML
GLUCOSE QUALITATIVE U: NEGATIVE MG/DL
GLUCOSE SERPL-MCNC: 107 MG/DL
HCT VFR BLD CALC: 37 %
HGB BLD-MCNC: 10.9 G/DL
IMM GRANULOCYTES NFR BLD AUTO: 0.3 %
KETONES URINE: NEGATIVE MG/DL
LEUKOCYTE ESTERASE URINE: NEGATIVE
LYMPHOCYTES # BLD AUTO: 1.93 K/UL
LYMPHOCYTES NFR BLD AUTO: 49.5 %
MAN DIFF?: NORMAL
MCHC RBC-ENTMCNC: 25.2 PG
MCHC RBC-ENTMCNC: 29.5 GM/DL
MCV RBC AUTO: 85.5 FL
MICROSCOPIC-UA: NORMAL
MONOCYTES # BLD AUTO: 0.4 K/UL
MONOCYTES NFR BLD AUTO: 10.3 %
NEUTROPHILS # BLD AUTO: 1.36 K/UL
NEUTROPHILS NFR BLD AUTO: 34.7 %
NITRITE URINE: NEGATIVE
PH URINE: 5.5
PLATELET # BLD AUTO: 194 K/UL
POTASSIUM SERPL-SCNC: 4.6 MMOL/L
PROT SERPL-MCNC: 7.3 G/DL
PROTEIN URINE: NORMAL MG/DL
RBC # BLD: 4.33 M/UL
RBC # FLD: 13.4 %
RED BLOOD CELLS URINE: 1 /HPF
SODIUM SERPL-SCNC: 144 MMOL/L
SPECIFIC GRAVITY URINE: 1.02
TSH SERPL-ACNC: 1.89 UIU/ML
UROBILINOGEN URINE: 0.2 MG/DL
VIT B12 SERPL-MCNC: 687 PG/ML
WBC # FLD AUTO: 3.9 K/UL
WHITE BLOOD CELLS URINE: 0 /HPF

## 2023-06-07 RX ORDER — ACETAMINOPHEN 500 MG/1
500 TABLET ORAL
Qty: 90 | Refills: 1 | Status: ACTIVE | COMMUNITY
Start: 2022-04-29 | End: 1900-01-01

## 2023-06-27 ENCOUNTER — APPOINTMENT (OUTPATIENT)
Dept: INTERNAL MEDICINE | Facility: CLINIC | Age: 88
End: 2023-06-27
Payer: MEDICARE

## 2023-06-27 VITALS
OXYGEN SATURATION: 99 % | BODY MASS INDEX: 27.17 KG/M2 | SYSTOLIC BLOOD PRESSURE: 176 MMHG | WEIGHT: 184 LBS | DIASTOLIC BLOOD PRESSURE: 72 MMHG | HEART RATE: 83 BPM | TEMPERATURE: 98.2 F

## 2023-06-27 DIAGNOSIS — Z23 ENCOUNTER FOR IMMUNIZATION: ICD-10-CM

## 2023-06-27 DIAGNOSIS — R73.09 OTHER ABNORMAL GLUCOSE: ICD-10-CM

## 2023-06-27 PROCEDURE — 36415 COLL VENOUS BLD VENIPUNCTURE: CPT

## 2023-06-27 PROCEDURE — 99213 OFFICE O/P EST LOW 20 MIN: CPT | Mod: 25

## 2023-06-27 PROCEDURE — 90670 PCV13 VACCINE IM: CPT

## 2023-06-27 PROCEDURE — G0009: CPT

## 2023-06-28 LAB
ANION GAP SERPL CALC-SCNC: 12 MMOL/L
BUN SERPL-MCNC: 17 MG/DL
CALCIUM SERPL-MCNC: 9.8 MG/DL
CHLORIDE SERPL-SCNC: 104 MMOL/L
CO2 SERPL-SCNC: 28 MMOL/L
CREAT SERPL-MCNC: 1.13 MG/DL
EGFR: 63 ML/MIN/1.73M2
ESTIMATED AVERAGE GLUCOSE: 108 MG/DL
GLUCOSE SERPL-MCNC: 97 MG/DL
HBA1C MFR BLD HPLC: 5.4 %
POTASSIUM SERPL-SCNC: 4 MMOL/L
SODIUM SERPL-SCNC: 144 MMOL/L

## 2023-07-03 ENCOUNTER — NON-APPOINTMENT (OUTPATIENT)
Age: 88
End: 2023-07-03

## 2023-07-17 ENCOUNTER — RX RENEWAL (OUTPATIENT)
Age: 88
End: 2023-07-17

## 2023-07-17 RX ORDER — IBUPROFEN 600 MG/1
600 TABLET, FILM COATED ORAL
Qty: 90 | Refills: 11 | Status: ACTIVE | COMMUNITY
Start: 2023-04-07 | End: 1900-01-01

## 2023-08-17 ENCOUNTER — APPOINTMENT (OUTPATIENT)
Dept: HEART AND VASCULAR | Facility: CLINIC | Age: 88
End: 2023-08-17

## 2023-09-21 ENCOUNTER — APPOINTMENT (OUTPATIENT)
Dept: INTERNAL MEDICINE | Facility: CLINIC | Age: 88
End: 2023-09-21

## 2023-09-26 ENCOUNTER — APPOINTMENT (OUTPATIENT)
Dept: INTERNAL MEDICINE | Facility: CLINIC | Age: 88
End: 2023-09-26
Payer: MEDICARE

## 2023-09-26 VITALS
BODY MASS INDEX: 25.62 KG/M2 | HEIGHT: 69 IN | HEART RATE: 81 BPM | DIASTOLIC BLOOD PRESSURE: 66 MMHG | WEIGHT: 173 LBS | TEMPERATURE: 97 F | SYSTOLIC BLOOD PRESSURE: 170 MMHG | OXYGEN SATURATION: 99 %

## 2023-09-26 DIAGNOSIS — Z00.00 ENCOUNTER FOR GENERAL ADULT MEDICAL EXAMINATION W/OUT ABNORMAL FINDINGS: ICD-10-CM

## 2023-09-26 DIAGNOSIS — M25.551 PAIN IN RIGHT HIP: ICD-10-CM

## 2023-09-26 PROCEDURE — 99215 OFFICE O/P EST HI 40 MIN: CPT

## 2023-09-26 RX ORDER — AMLODIPINE BESYLATE 5 MG/1
5 TABLET ORAL
Qty: 90 | Refills: 3 | Status: DISCONTINUED | COMMUNITY
Start: 2022-04-29 | End: 2023-09-26

## 2023-10-09 ENCOUNTER — APPOINTMENT (OUTPATIENT)
Dept: ORTHOPEDIC SURGERY | Facility: CLINIC | Age: 88
End: 2023-10-09
Payer: MEDICARE

## 2023-10-09 ENCOUNTER — OUTPATIENT (OUTPATIENT)
Dept: OUTPATIENT SERVICES | Facility: HOSPITAL | Age: 88
LOS: 1 days | End: 2023-10-09
Payer: MEDICARE

## 2023-10-09 ENCOUNTER — RESULT REVIEW (OUTPATIENT)
Age: 88
End: 2023-10-09

## 2023-10-09 VITALS
HEIGHT: 69 IN | OXYGEN SATURATION: 99 % | DIASTOLIC BLOOD PRESSURE: 72 MMHG | WEIGHT: 173 LBS | HEART RATE: 90 BPM | SYSTOLIC BLOOD PRESSURE: 150 MMHG | BODY MASS INDEX: 25.62 KG/M2

## 2023-10-09 DIAGNOSIS — Z96.651 PRESENCE OF RIGHT ARTIFICIAL KNEE JOINT: Chronic | ICD-10-CM

## 2023-10-09 DIAGNOSIS — Z94.7 CORNEAL TRANSPLANT STATUS: Chronic | ICD-10-CM

## 2023-10-09 DIAGNOSIS — M16.11 UNILATERAL PRIMARY OSTEOARTHRITIS, RIGHT HIP: ICD-10-CM

## 2023-10-09 DIAGNOSIS — Z96.642 PRESENCE OF LEFT ARTIFICIAL HIP JOINT: ICD-10-CM

## 2023-10-09 PROCEDURE — 73502 X-RAY EXAM HIP UNI 2-3 VIEWS: CPT | Mod: 26,RT

## 2023-10-09 PROCEDURE — 99204 OFFICE O/P NEW MOD 45 MIN: CPT

## 2023-10-09 PROCEDURE — 73502 X-RAY EXAM HIP UNI 2-3 VIEWS: CPT

## 2023-10-10 ENCOUNTER — RX RENEWAL (OUTPATIENT)
Age: 88
End: 2023-10-10

## 2023-11-27 RX ORDER — CALCIUM CARBONATE 160(400)MG
TABLET,CHEWABLE ORAL
Qty: 1 | Refills: 0 | Status: ACTIVE | COMMUNITY
Start: 2023-11-27 | End: 1900-01-01

## 2023-12-26 ENCOUNTER — RX RENEWAL (OUTPATIENT)
Age: 88
End: 2023-12-26

## 2023-12-26 RX ORDER — DONEPEZIL HYDROCHLORIDE 5 MG/1
5 TABLET ORAL
Qty: 100 | Refills: 2 | Status: ACTIVE | COMMUNITY
Start: 2022-06-17 | End: 1900-01-01

## 2023-12-28 ENCOUNTER — APPOINTMENT (OUTPATIENT)
Dept: INTERNAL MEDICINE | Facility: CLINIC | Age: 88
End: 2023-12-28
Payer: MEDICARE

## 2023-12-28 VITALS
BODY MASS INDEX: 26.66 KG/M2 | SYSTOLIC BLOOD PRESSURE: 144 MMHG | HEART RATE: 90 BPM | TEMPERATURE: 98.7 F | HEIGHT: 69 IN | OXYGEN SATURATION: 97 % | DIASTOLIC BLOOD PRESSURE: 79 MMHG | WEIGHT: 180 LBS

## 2023-12-28 DIAGNOSIS — R60.0 LOCALIZED EDEMA: ICD-10-CM

## 2023-12-28 DIAGNOSIS — M16.11 UNILATERAL PRIMARY OSTEOARTHRITIS, RIGHT HIP: ICD-10-CM

## 2023-12-28 DIAGNOSIS — I10 ESSENTIAL (PRIMARY) HYPERTENSION: ICD-10-CM

## 2023-12-28 DIAGNOSIS — F03.90 UNSPECIFIED DEMENTIA W/OUT BEHAVIORAL DISTURBANCE: ICD-10-CM

## 2023-12-28 PROCEDURE — 99214 OFFICE O/P EST MOD 30 MIN: CPT

## 2023-12-28 RX ORDER — CALCIUM CARBONATE 160(400)MG
TABLET,CHEWABLE ORAL
Qty: 1 | Refills: 0 | Status: ACTIVE | OUTPATIENT
Start: 2023-12-28

## 2023-12-28 NOTE — HISTORY OF PRESENT ILLNESS
[de-identified] : Mr. BLANCO is a88 year M with PMH of HTN, right hip OA and dementia who comes for follow up. Compliant with medications. HHA form filled today. BP kit prescribed.

## 2024-01-28 ENCOUNTER — EMERGENCY (EMERGENCY)
Facility: HOSPITAL | Age: 89
LOS: 1 days | Discharge: HOME | End: 2024-01-28
Attending: EMERGENCY MEDICINE | Admitting: EMERGENCY MEDICINE
Payer: MEDICARE

## 2024-01-28 VITALS
TEMPERATURE: 99 F | HEART RATE: 85 BPM | SYSTOLIC BLOOD PRESSURE: 115 MMHG | OXYGEN SATURATION: 97 % | DIASTOLIC BLOOD PRESSURE: 68 MMHG | RESPIRATION RATE: 18 BRPM

## 2024-01-28 VITALS
SYSTOLIC BLOOD PRESSURE: 121 MMHG | HEART RATE: 75 BPM | DIASTOLIC BLOOD PRESSURE: 68 MMHG | TEMPERATURE: 98 F | OXYGEN SATURATION: 99 % | RESPIRATION RATE: 18 BRPM

## 2024-01-28 DIAGNOSIS — R05.9 COUGH, UNSPECIFIED: ICD-10-CM

## 2024-01-28 DIAGNOSIS — Z94.7 CORNEAL TRANSPLANT STATUS: Chronic | ICD-10-CM

## 2024-01-28 DIAGNOSIS — G89.29 OTHER CHRONIC PAIN: ICD-10-CM

## 2024-01-28 DIAGNOSIS — F03.90 UNSPECIFIED DEMENTIA WITHOUT BEHAVIORAL DISTURBANCE: ICD-10-CM

## 2024-01-28 DIAGNOSIS — U07.1 COVID-19: ICD-10-CM

## 2024-01-28 DIAGNOSIS — M25.551 PAIN IN RIGHT HIP: ICD-10-CM

## 2024-01-28 DIAGNOSIS — I10 ESSENTIAL (PRIMARY) HYPERTENSION: ICD-10-CM

## 2024-01-28 DIAGNOSIS — E78.5 HYPERLIPIDEMIA, UNSPECIFIED: ICD-10-CM

## 2024-01-28 DIAGNOSIS — I44.0 ATRIOVENTRICULAR BLOCK, FIRST DEGREE: ICD-10-CM

## 2024-01-28 DIAGNOSIS — Z96.651 PRESENCE OF RIGHT ARTIFICIAL KNEE JOINT: Chronic | ICD-10-CM

## 2024-01-28 LAB
ALBUMIN SERPL ELPH-MCNC: 4.1 G/DL — SIGNIFICANT CHANGE UP (ref 3.3–5)
ALP SERPL-CCNC: 90 U/L — SIGNIFICANT CHANGE UP (ref 40–120)
ALT FLD-CCNC: 19 U/L — SIGNIFICANT CHANGE UP (ref 10–45)
ANION GAP SERPL CALC-SCNC: 11 MMOL/L — SIGNIFICANT CHANGE UP (ref 5–17)
AST SERPL-CCNC: 22 U/L — SIGNIFICANT CHANGE UP (ref 10–40)
BILIRUB SERPL-MCNC: 0.9 MG/DL — SIGNIFICANT CHANGE UP (ref 0.2–1.2)
BUN SERPL-MCNC: 20 MG/DL — SIGNIFICANT CHANGE UP (ref 7–23)
CALCIUM SERPL-MCNC: 9.6 MG/DL — SIGNIFICANT CHANGE UP (ref 8.4–10.5)
CHLORIDE SERPL-SCNC: 104 MMOL/L — SIGNIFICANT CHANGE UP (ref 96–108)
CO2 SERPL-SCNC: 24 MMOL/L — SIGNIFICANT CHANGE UP (ref 22–31)
CREAT SERPL-MCNC: 1.27 MG/DL — SIGNIFICANT CHANGE UP (ref 0.5–1.3)
EGFR: 55 ML/MIN/1.73M2 — LOW
FLUAV AG NPH QL: SIGNIFICANT CHANGE UP
FLUBV AG NPH QL: SIGNIFICANT CHANGE UP
GLUCOSE SERPL-MCNC: 128 MG/DL — HIGH (ref 70–99)
HCT VFR BLD CALC: 33.4 % — LOW (ref 39–50)
HGB BLD-MCNC: 10.2 G/DL — LOW (ref 13–17)
MCHC RBC-ENTMCNC: 25.4 PG — LOW (ref 27–34)
MCHC RBC-ENTMCNC: 30.5 GM/DL — LOW (ref 32–36)
MCV RBC AUTO: 83.3 FL — SIGNIFICANT CHANGE UP (ref 80–100)
NRBC # BLD: 0 /100 WBCS — SIGNIFICANT CHANGE UP (ref 0–0)
PLATELET # BLD AUTO: 165 K/UL — SIGNIFICANT CHANGE UP (ref 150–400)
POTASSIUM SERPL-MCNC: 3.9 MMOL/L — SIGNIFICANT CHANGE UP (ref 3.5–5.3)
POTASSIUM SERPL-SCNC: 3.9 MMOL/L — SIGNIFICANT CHANGE UP (ref 3.5–5.3)
PROT SERPL-MCNC: 7 G/DL — SIGNIFICANT CHANGE UP (ref 6–8.3)
RBC # BLD: 4.01 M/UL — LOW (ref 4.2–5.8)
RBC # FLD: 13.2 % — SIGNIFICANT CHANGE UP (ref 10.3–14.5)
RSV RNA NPH QL NAA+NON-PROBE: SIGNIFICANT CHANGE UP
SARS-COV-2 RNA SPEC QL NAA+PROBE: DETECTED
SODIUM SERPL-SCNC: 139 MMOL/L — SIGNIFICANT CHANGE UP (ref 135–145)
TROPONIN T, HIGH SENSITIVITY RESULT: 50 NG/L — SIGNIFICANT CHANGE UP (ref 0–51)
TROPONIN T, HIGH SENSITIVITY RESULT: 54 NG/L — CRITICAL HIGH (ref 0–51)
WBC # BLD: 4.84 K/UL — SIGNIFICANT CHANGE UP (ref 3.8–10.5)
WBC # FLD AUTO: 4.84 K/UL — SIGNIFICANT CHANGE UP (ref 3.8–10.5)

## 2024-01-28 PROCEDURE — 99285 EMERGENCY DEPT VISIT HI MDM: CPT

## 2024-01-28 PROCEDURE — 84484 ASSAY OF TROPONIN QUANT: CPT

## 2024-01-28 PROCEDURE — 71045 X-RAY EXAM CHEST 1 VIEW: CPT

## 2024-01-28 PROCEDURE — 80053 COMPREHEN METABOLIC PANEL: CPT

## 2024-01-28 PROCEDURE — 87637 SARSCOV2&INF A&B&RSV AMP PRB: CPT

## 2024-01-28 PROCEDURE — 96374 THER/PROPH/DIAG INJ IV PUSH: CPT

## 2024-01-28 PROCEDURE — 94640 AIRWAY INHALATION TREATMENT: CPT

## 2024-01-28 PROCEDURE — 93005 ELECTROCARDIOGRAM TRACING: CPT

## 2024-01-28 PROCEDURE — 93010 ELECTROCARDIOGRAM REPORT: CPT

## 2024-01-28 PROCEDURE — 36415 COLL VENOUS BLD VENIPUNCTURE: CPT

## 2024-01-28 PROCEDURE — 99285 EMERGENCY DEPT VISIT HI MDM: CPT | Mod: 25

## 2024-01-28 PROCEDURE — 85027 COMPLETE CBC AUTOMATED: CPT

## 2024-01-28 PROCEDURE — 71045 X-RAY EXAM CHEST 1 VIEW: CPT | Mod: 26

## 2024-01-28 RX ORDER — DEXAMETHASONE 0.5 MG/5ML
6 ELIXIR ORAL ONCE
Refills: 0 | Status: COMPLETED | OUTPATIENT
Start: 2024-01-28 | End: 2024-01-28

## 2024-01-28 RX ORDER — IPRATROPIUM/ALBUTEROL SULFATE 18-103MCG
3 AEROSOL WITH ADAPTER (GRAM) INHALATION ONCE
Refills: 0 | Status: COMPLETED | OUTPATIENT
Start: 2024-01-28 | End: 2024-01-28

## 2024-01-28 RX ORDER — IBUPROFEN 200 MG
600 TABLET ORAL ONCE
Refills: 0 | Status: COMPLETED | OUTPATIENT
Start: 2024-01-28 | End: 2024-01-28

## 2024-01-28 RX ADMIN — Medication 600 MILLIGRAM(S): at 05:11

## 2024-01-28 RX ADMIN — Medication 3 MILLILITER(S): at 03:54

## 2024-01-28 RX ADMIN — Medication 6 MILLIGRAM(S): at 03:55

## 2024-01-28 NOTE — ED PROVIDER NOTE - NSICDXPASTSURGICALHX_GEN_ALL_CORE_FT
PAST SURGICAL HISTORY:  Corneal transplant status right eye    H/O total knee replacement, right

## 2024-01-28 NOTE — ED PROVIDER NOTE - NSFOLLOWUPINSTRUCTIONS_ED_ALL_ED_FT
Please rest and remain well hydrated with plenty of fluids.  You can take motrin 600-800mg and tylenol 650mg every 3 hours, switching between the two for pain/bodyaches or fevers (>100.4F/>38C)    Please call to arrange follow up with primary care doctor within one week    Please return to ED if you have difficulty breathing, chest pain, dizziness, fainting, vomiting or other concerns    COVID-19  COVID-19 is an infection caused by a virus called SARS-CoV-2. Most people who get COVID-19 have mild to moderate symptoms. Some have little to no symptoms. In others, the virus may cause a severe infection.    What are the causes?  The human body, showing how the coronavirus travels from the air to a person's lungs.  COVID-19 is caused by a coronavirus. The virus may be in the air as droplets or as tiny specks of fluid (aerosols). It may also be on surfaces. You may catch the virus if you:  Breathe in droplets when a person with COVID-19 breathes, speaks, sings, coughs, or sneezes.  Touch something that has the virus on it and then touch your mouth, nose, or eyes.  What increases the risk?  Risk for infection:    You are more likely to get COVID-19 if:  You are within 6 ft (1.8 m) of a person who has COVID-19 for 15 minutes or longer.  You provide care to a person who has COVID-19.  You are in close contact with others. This includes hugging, kissing, or sharing utensils.  Risk for serious illness caused by COVID-19:    You are more likely to get very ill from COVID-19 if:  You have cancer.  You have a long-term (chronic) disease. This may be:  A chronic lung disease, such as pulmonary embolism, chronic obstructive pulmonary disease (COPD), or cystic fibrosis.  A disease that affects your body's defense system (immune system). If you have a weak immune system, you are said to be immunocompromised.  A serious heart condition, such as heart failure, coronary artery disease, or cardiomyopathy.  Diabetes.  Chronic kidney disease.  A liver disease, such as cirrhosis, nonalcoholic fatty liver disease, alcoholic liver disease, or autoimmune hepatitis.  You are obese.  You are pregnant or were just pregnant.  You have sickle cell disease.  What are the signs or symptoms?  Symptoms of COVID-19 can range from mild to severe. They may appear any time from 2 to 14 days after you are exposed. They include:  Fever or chills.  Shortness of breath or trouble breathing.  Feeling tired.  Headaches, body aches, or muscle aches.  A runny or stuffy nose.  Sneezing, coughing, or a sore throat.  New loss of taste or smell.  You may also have stomach problems, such as nausea, vomiting, or diarrhea.    In some cases, you may not have any symptoms.    How is this diagnosed?  A sample being collected by swabbing the nose.  COVID-19 may be diagnosed by testing a sample to check for the virus. The most common tests are the PCR test and the antigen test. Tests may be done in the lab or at home. They include:  Using a swab to take a sample of fluid from your nose.  Testing a sample of saliva from your mouth.  Testing a sample of mucus from your lungs (sputum).  How is this treated?  Treatment for COVID-19 depends on how severe your condition is.  Mild symptoms can be treated at home. You should rest, drink fluids, and take over-the-counter medicine.  If you have symptoms and risk factors, you may be prescribed a medicine that fights viruses (antiviral).  Severe symptoms may be treated in a hospital intensive care unit (ICU). Treatment may include:  Extra oxygen given through a tube in the nose, a face mask, or a campbell.  Medicines. These may include:  Antivirals, such as remdesivir.  Anti-inflammatories, such as corticosteroids. These help reduce inflammation.  Antithrombotics. These help prevent or treat blood clots.  Convalescent plasma. This helps boost your immune system.  Prone positioning. This is when you are laid on your stomach to help oxygen get into your lungs.  Infection control measures.  If you are at risk for a more serious illness, your health care provider may prescribe two medicines to help your immune system protect you. These are called long-acting monoclonal antibodies. They are given together every 6 months.    How is this prevented?  To protect yourself:    Get the vaccine or vaccine series if you meet the guidelines. You can even get the vaccine while you are pregnant or making breast milk (lactating).  Get an added dose of the vaccine if you are immunocompromised. This applies if you have had an organ transplant or if you have a condition that affects your immune system.  You should get the added dose 4 weeks after you got the first one.  If you get an mRNA vaccine, you will need to get 3 doses.  Talk to your provider about getting experimental monoclonal antibodies. This treatment can help prevent severe illness. It may be given to you if:  You are immunocompromised.  You cannot get the vaccine. You may not get the vaccine if you have a severe allergic reaction to it or to what it is made of.  You are not fully vaccinated.  You are in a place where there is COVID-19 and:  You are in close contact with someone who has COVID-19.  You are at high risk of being exposed.  You are at risk of illness from new variants of the virus.  To protect others:    If you have symptoms of COVID-19, take steps to stop the virus from spreading.  Stay home. Leave your house only to get medical care. Do not use public transit.  Do not travel while you are sick.  Wash your hands often with soap and water for at least 20 seconds. If soap and water are not available, use alcohol-based hand .  Make sure that all people in your household wash their hands well and often.  Cough or sneeze into a tissue or your sleeve or elbow. Do not cough or sneeze into your hand or into the air.  Where to find more information  Centers for Disease Control and Prevention (CDC): cdc.gov  World Health Organization (WHO): who.int  Get help right away if:  You have trouble breathing.  You have pain or pressure in your chest.  You are confused.  Your lips or fingernails turn blue.  You have trouble waking from sleep.  Your symptoms get worse.  These symptoms may be an emergency. Get help right away. Call 911.  Do not wait to see if the symptoms will go away.  Do not drive yourself to the hospital.  This information is not intended to replace advice given to you by your health care provider. Make sure you discuss any questions you have with your health care provider.

## 2024-01-28 NOTE — ED ADULT TRIAGE NOTE - ARRIVAL INFO ADDITIONAL COMMENTS
per wife (pt has dementia) he has been coughing and his covid test is positive.   also c/o chronic right hip pain.

## 2024-01-28 NOTE — ED PROVIDER NOTE - NSICDXPASTMEDICALHX_GEN_ALL_CORE_FT
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

## 2024-01-28 NOTE — ED PROVIDER NOTE - ATTENDING APP SHARED VISIT CONTRIBUTION OF CARE
agree with RYAN,Patient well-appearing on exam, chest x-ray grossly negative for pneumonia, patient not hypoxic received steroids only complaining of mild cough, caretaker is primarily his wife who is with him here today as well.  Neither patient nor wife went to stay in the ED or be admitted.  Initial troponin indeterminant second troponin send signed out to incoming day team including LIANG FOX and attending LN  for repeat troponin and reassessment

## 2024-01-28 NOTE — ED ADULT TRIAGE NOTE - SOURCE OF INFORMATION
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      
Patient

## 2024-01-28 NOTE — ED ADULT NURSE REASSESSMENT NOTE - NS ED NURSE REASSESS COMMENT FT1
Report received from nightshift RN, pt is A&O, resting comfortably in stretcher. Report received from nightshift RN, pt is alert to baseline, resting comfortably in stretcher.

## 2024-01-28 NOTE — ED PROVIDER NOTE - PATIENT PORTAL LINK FT
You can access the FollowMyHealth Patient Portal offered by [facility] by registering at the following website: [email or web address]. By joining [facility]’s FollowMyHealth portal, you will also be able to view your health information using other applications (apps) compatible with our system.

## 2024-01-28 NOTE — ED PROVIDER NOTE - PROGRESS NOTE DETAILS
labs reviewed; trop 50, normal lytes, covid +.  cxr no focal infiltrate/effusion.  ekg sinus w/ 1st degree av block, no ischemic changes.  will plan to rpt trop.  pt would like to go home, friends in waiting room here to assist pt home if dc and has HHA 6days week. repeat trop 54, pt with no CP, has covid, not suspicious for ACS.  Pt stable for d/c, no resp distress or O2 requirement.  Recommend continued supportive care, f/u pmd, return to ED if sx worsen.

## 2024-01-28 NOTE — ED PROVIDER NOTE - CLINICAL SUMMARY MEDICAL DECISION MAKING FREE TEXT BOX
88 M Je[employer] witness, pmh dementia (has 4hr HHA 6 days/week), htn, hld, chronic R hip pain here with wife c/o cough and fatigue x 3 days.  covid + at home.  on exam vss, afebrile, comfortable nontoxic appearing elderly M, no hypoxia or resp distress, b/l breath sounds, no w/r, no LE edema.  suspect sxs 2/2 covid, r/o pneumonia, low suspision acs.  will obtain labs, ekg, cxr, motrin for chronic R hip pain, duoneb

## 2024-01-28 NOTE — ED PROVIDER NOTE - PHYSICAL EXAMINATION
Vitals reviewed  Gen: comfortable appearing elderly M, nad, speaking in full sentences, no hypoxia/dyspnea   Skin: wwp, no rash/lesions  HEENT: ncat, eomi, mmm, airway patent   CV: +s1/2, no audible m/r/g  Resp: symmetrical expansion, b/l breath sounds, no w/r/r  Abd: nondistended, soft/nt  Ext: FROM throughout, no peripheral edema or calf ttp, no muscle or joint ttp, distal pulses 2+  Neuro: alert/oriented x2 (self/place), no focal deficits, steady gait

## 2024-01-28 NOTE — ED ADULT NURSE NOTE - NSFALLRISKINTERV_ED_ALL_ED

## 2024-01-28 NOTE — ED PROVIDER NOTE - OBJECTIVE STATEMENT
88 M Je[employer] witness, pmh dementia (has 4hr HHA 6 days/week), htn, hld, chronic R hip pain here with wife c/o cough and fatigue x 3 days.  wife reports he has had dry cough for 3 days, worse at night and she feels he has difficulty breathing at night but pt denies.  tested covid + at home test.  wife also w/ similar sxs.  denies f/c, HA, dizziness, fainting, chest pain, abd pain, nvd, urinary sxs, leg pain/swelling, travel, trauma

## 2024-04-03 RX ORDER — HYDROCHLOROTHIAZIDE 25 MG/1
25 TABLET ORAL
Qty: 90 | Refills: 3 | Status: ACTIVE | COMMUNITY
Start: 2022-06-16 | End: 1900-01-01

## 2024-05-10 ENCOUNTER — APPOINTMENT (OUTPATIENT)
Dept: INTERNAL MEDICINE | Facility: CLINIC | Age: 89
End: 2024-05-10

## 2024-06-10 ENCOUNTER — RX RENEWAL (OUTPATIENT)
Age: 89
End: 2024-06-10

## 2024-06-10 RX ORDER — AMLODIPINE BESYLATE 10 MG/1
10 TABLET ORAL
Qty: 100 | Refills: 2 | Status: ACTIVE | COMMUNITY
Start: 2023-09-26 | End: 1900-01-01

## 2024-08-21 ENCOUNTER — NON-APPOINTMENT (OUTPATIENT)
Age: 89
End: 2024-08-21

## 2024-10-29 DIAGNOSIS — M79.673 PAIN IN UNSPECIFIED FOOT: ICD-10-CM

## 2025-01-15 ENCOUNTER — APPOINTMENT (OUTPATIENT)
Dept: INTERNAL MEDICINE | Facility: CLINIC | Age: 89
End: 2025-01-15

## 2025-02-07 ENCOUNTER — RX RENEWAL (OUTPATIENT)
Age: 89
End: 2025-02-07

## 2025-04-17 ENCOUNTER — APPOINTMENT (OUTPATIENT)
Dept: HEART AND VASCULAR | Facility: CLINIC | Age: 89
End: 2025-04-17